# Patient Record
Sex: FEMALE | Race: BLACK OR AFRICAN AMERICAN | NOT HISPANIC OR LATINO | Employment: FULL TIME | URBAN - METROPOLITAN AREA
[De-identification: names, ages, dates, MRNs, and addresses within clinical notes are randomized per-mention and may not be internally consistent; named-entity substitution may affect disease eponyms.]

---

## 2021-03-20 PROCEDURE — 87086 URINE CULTURE/COLONY COUNT: CPT | Performed by: NURSE PRACTITIONER

## 2021-03-20 PROCEDURE — 87798 DETECT AGENT NOS DNA AMP: CPT | Performed by: NURSE PRACTITIONER

## 2021-03-20 PROCEDURE — 87801 DETECT AGNT MULT DNA AMPLI: CPT | Performed by: NURSE PRACTITIONER

## 2021-03-23 ENCOUNTER — TELEPHONE (OUTPATIENT)
Dept: URGENT CARE | Facility: CLINIC | Age: 26
End: 2021-03-23

## 2021-09-15 PROCEDURE — U0004 COV-19 TEST NON-CDC HGH THRU: HCPCS | Performed by: FAMILY MEDICINE

## 2021-09-17 ENCOUNTER — TELEPHONE (OUTPATIENT)
Dept: URGENT CARE | Facility: CLINIC | Age: 26
End: 2021-09-17

## 2021-09-27 ENCOUNTER — TRANSCRIBE ORDERS (OUTPATIENT)
Dept: ADMINISTRATIVE | Facility: HOSPITAL | Age: 26
End: 2021-09-27

## 2021-09-27 DIAGNOSIS — G44.52 HEADACHE, NEW DAILY PERSISTENT (NDPH): Primary | ICD-10-CM

## 2021-10-13 ENCOUNTER — HOSPITAL ENCOUNTER (OUTPATIENT)
Dept: CT IMAGING | Facility: HOSPITAL | Age: 26
Discharge: HOME | End: 2021-10-13
Admitting: NURSE PRACTITIONER

## 2021-10-13 DIAGNOSIS — G44.52 HEADACHE, NEW DAILY PERSISTENT (NDPH): ICD-10-CM

## 2021-10-13 PROCEDURE — 70450 CT HEAD/BRAIN W/O DYE: CPT

## 2024-05-09 ENCOUNTER — TRANSCRIBE ORDERS (OUTPATIENT)
Dept: NUTRITION | Facility: HOSPITAL | Age: 29
End: 2024-05-09
Payer: COMMERCIAL

## 2024-05-09 DIAGNOSIS — R73.03 PRE-DIABETES: Primary | ICD-10-CM

## 2024-06-28 ENCOUNTER — DOCUMENTATION (OUTPATIENT)
Dept: NUTRITION | Facility: HOSPITAL | Age: 29
End: 2024-06-28
Payer: COMMERCIAL

## 2024-06-28 ENCOUNTER — HOSPITAL ENCOUNTER (OUTPATIENT)
Dept: DIABETES SERVICES | Facility: HOSPITAL | Age: 29
Setting detail: RECURRING SERIES
Discharge: HOME | End: 2024-06-28

## 2024-07-01 NOTE — PROGRESS NOTES
Nutrition Services    Patient Name:  Deann Blackburn  YOB: 1995  MRN: [id number]  Admit Date:  (Not on file)    The patient attended 90 minute Diabetes Nutrition Appointment today for Prediabetes Education. Epic Users-see media tab for assessments and notes. Non-Epic users, assessments and notes will be sent per protocol.      Electronically signed by:  Sera Hutchinson RD  07/01/24 18:05 EDT

## 2025-02-12 ENCOUNTER — LAB (OUTPATIENT)
Facility: HOSPITAL | Age: 30
End: 2025-02-12
Payer: COMMERCIAL

## 2025-02-12 ENCOUNTER — TRANSCRIBE ORDERS (OUTPATIENT)
Facility: HOSPITAL | Age: 30
End: 2025-02-12
Payer: COMMERCIAL

## 2025-02-12 DIAGNOSIS — Z32.01 PREGNANCY EXAMINATION OR TEST, POSITIVE RESULT: ICD-10-CM

## 2025-02-12 DIAGNOSIS — Z32.01 PREGNANCY EXAMINATION OR TEST, POSITIVE RESULT: Primary | ICD-10-CM

## 2025-02-12 LAB
HCG INTACT+B SERPL-ACNC: 162 MIU/ML
PROGEST SERPL-MCNC: 6.08 NG/ML

## 2025-02-12 PROCEDURE — 84144 ASSAY OF PROGESTERONE: CPT

## 2025-02-12 PROCEDURE — 36415 COLL VENOUS BLD VENIPUNCTURE: CPT

## 2025-02-12 PROCEDURE — 84702 CHORIONIC GONADOTROPIN TEST: CPT

## 2025-02-14 ENCOUNTER — TRANSCRIBE ORDERS (OUTPATIENT)
Dept: LAB | Facility: HOSPITAL | Age: 30
End: 2025-02-14
Payer: COMMERCIAL

## 2025-02-14 ENCOUNTER — LAB (OUTPATIENT)
Dept: LAB | Facility: HOSPITAL | Age: 30
End: 2025-02-14
Payer: COMMERCIAL

## 2025-02-14 DIAGNOSIS — N92.6 IRREGULAR MENSTRUAL CYCLE: ICD-10-CM

## 2025-02-14 DIAGNOSIS — N92.6 IRREGULAR MENSTRUAL CYCLE: Primary | ICD-10-CM

## 2025-02-14 LAB — HCG INTACT+B SERPL-ACNC: 72.4 MIU/ML

## 2025-02-14 PROCEDURE — 84702 CHORIONIC GONADOTROPIN TEST: CPT

## 2025-02-14 PROCEDURE — 36415 COLL VENOUS BLD VENIPUNCTURE: CPT

## 2025-02-15 ENCOUNTER — APPOINTMENT (OUTPATIENT)
Facility: HOSPITAL | Age: 30
End: 2025-02-15
Payer: COMMERCIAL

## 2025-02-15 ENCOUNTER — HOSPITAL ENCOUNTER (EMERGENCY)
Facility: HOSPITAL | Age: 30
Discharge: HOME | End: 2025-02-15
Attending: FAMILY MEDICINE
Payer: COMMERCIAL

## 2025-02-15 VITALS
TEMPERATURE: 98.8 F | SYSTOLIC BLOOD PRESSURE: 155 MMHG | HEIGHT: 62 IN | RESPIRATION RATE: 16 BRPM | OXYGEN SATURATION: 98 % | WEIGHT: 181.4 LBS | HEART RATE: 89 BPM | DIASTOLIC BLOOD PRESSURE: 105 MMHG | BODY MASS INDEX: 33.38 KG/M2

## 2025-02-15 DIAGNOSIS — O20.9 VAGINAL BLEEDING IN PREGNANCY, FIRST TRIMESTER: Primary | ICD-10-CM

## 2025-02-15 LAB
ABO GROUP BLD: NORMAL
AMORPH URATE CRY URNS QL MICRO: ABNORMAL /HPF
BACTERIA UR QL AUTO: ABNORMAL /HPF
BASOPHILS # BLD AUTO: 0.02 10*3/MM3 (ref 0–0.2)
BASOPHILS NFR BLD AUTO: 0.4 % (ref 0–1.5)
BILIRUB UR QL STRIP: NEGATIVE
CLARITY UR: ABNORMAL
COLOR UR: YELLOW
DEPRECATED RDW RBC AUTO: 40.4 FL (ref 37–54)
EOSINOPHIL # BLD AUTO: 0.02 10*3/MM3 (ref 0–0.4)
EOSINOPHIL NFR BLD AUTO: 0.4 % (ref 0.3–6.2)
ERYTHROCYTE [DISTWIDTH] IN BLOOD BY AUTOMATED COUNT: 14.7 % (ref 12.3–15.4)
GLUCOSE UR STRIP-MCNC: NEGATIVE MG/DL
HCG INTACT+B SERPL-ACNC: 55.3 MIU/ML
HCT VFR BLD AUTO: 37.7 % (ref 34–46.6)
HGB BLD-MCNC: 11.5 G/DL (ref 12–15.9)
HGB UR QL STRIP.AUTO: ABNORMAL
HOLD SPECIMEN: NORMAL
HYALINE CASTS UR QL AUTO: ABNORMAL /LPF
IMM GRANULOCYTES # BLD AUTO: 0 10*3/MM3 (ref 0–0.05)
IMM GRANULOCYTES NFR BLD AUTO: 0 % (ref 0–0.5)
KETONES UR QL STRIP: ABNORMAL
LEUKOCYTE ESTERASE UR QL STRIP.AUTO: NEGATIVE
LYMPHOCYTES # BLD AUTO: 2.01 10*3/MM3 (ref 0.7–3.1)
LYMPHOCYTES NFR BLD AUTO: 40.9 % (ref 19.6–45.3)
MCH RBC QN AUTO: 22.8 PG (ref 26.6–33)
MCHC RBC AUTO-ENTMCNC: 30.5 G/DL (ref 31.5–35.7)
MCV RBC AUTO: 74.8 FL (ref 79–97)
MONOCYTES # BLD AUTO: 0.43 10*3/MM3 (ref 0.1–0.9)
MONOCYTES NFR BLD AUTO: 8.7 % (ref 5–12)
NEUTROPHILS NFR BLD AUTO: 2.44 10*3/MM3 (ref 1.7–7)
NEUTROPHILS NFR BLD AUTO: 49.6 % (ref 42.7–76)
NITRITE UR QL STRIP: NEGATIVE
NUMBER OF DOSES: NORMAL
PH UR STRIP.AUTO: 6 [PH] (ref 5–8)
PLATELET # BLD AUTO: 314 10*3/MM3 (ref 140–450)
PMV BLD AUTO: 10.6 FL (ref 6–12)
PROT UR QL STRIP: ABNORMAL
RBC # BLD AUTO: 5.04 10*6/MM3 (ref 3.77–5.28)
RBC # UR STRIP: ABNORMAL /HPF
REF LAB TEST METHOD: ABNORMAL
RH BLD: NEGATIVE
SP GR UR STRIP: >=1.03 (ref 1–1.03)
SQUAMOUS #/AREA URNS HPF: ABNORMAL /HPF
UROBILINOGEN UR QL STRIP: ABNORMAL
WBC # UR STRIP: ABNORMAL /HPF
WBC NRBC COR # BLD AUTO: 4.92 10*3/MM3 (ref 3.4–10.8)
WHOLE BLOOD HOLD COAG: NORMAL
WHOLE BLOOD HOLD SPECIMEN: NORMAL

## 2025-02-15 PROCEDURE — 76817 TRANSVAGINAL US OBSTETRIC: CPT

## 2025-02-15 PROCEDURE — 96372 THER/PROPH/DIAG INJ SC/IM: CPT

## 2025-02-15 PROCEDURE — 85025 COMPLETE CBC W/AUTO DIFF WBC: CPT | Performed by: FAMILY MEDICINE

## 2025-02-15 PROCEDURE — 36415 COLL VENOUS BLD VENIPUNCTURE: CPT

## 2025-02-15 PROCEDURE — 86900 BLOOD TYPING SEROLOGIC ABO: CPT | Performed by: FAMILY MEDICINE

## 2025-02-15 PROCEDURE — 25010000002 RHO D IMMUNE GLOBULIN 1500 UNIT/2ML SOLUTION PREFILLED SYRINGE: Performed by: PHYSICIAN ASSISTANT

## 2025-02-15 PROCEDURE — 99284 EMERGENCY DEPT VISIT MOD MDM: CPT

## 2025-02-15 PROCEDURE — 84702 CHORIONIC GONADOTROPIN TEST: CPT | Performed by: FAMILY MEDICINE

## 2025-02-15 PROCEDURE — 86901 BLOOD TYPING SEROLOGIC RH(D): CPT | Performed by: FAMILY MEDICINE

## 2025-02-15 PROCEDURE — 81001 URINALYSIS AUTO W/SCOPE: CPT | Performed by: PHYSICIAN ASSISTANT

## 2025-02-15 PROCEDURE — 87086 URINE CULTURE/COLONY COUNT: CPT | Performed by: PHYSICIAN ASSISTANT

## 2025-02-15 RX ORDER — SODIUM CHLORIDE 0.9 % (FLUSH) 0.9 %
10 SYRINGE (ML) INJECTION AS NEEDED
Status: DISCONTINUED | OUTPATIENT
Start: 2025-02-15 | End: 2025-02-15 | Stop reason: HOSPADM

## 2025-02-15 RX ADMIN — HUMAN RHO(D) IMMUNE GLOBULIN 1500 UNITS: 1500 SOLUTION INTRAMUSCULAR; INTRAVENOUS at 10:40

## 2025-02-15 NOTE — FSED PROVIDER NOTE
"Subjective  History of Present Illness:    29-year-old  female presents to ED for evaluation of vaginal bleeding during pregnancy.  Patient reports first day of last menstrual cycle as .  She took a positive pregnancy test last week.  On  she began having some light pink vaginal spotting.  Patient follows with [facility].  She was evaluated and had lab work done relative to the bleeding on  and again on .  She has not received the results.  This morning she began having some mildly increased bleeding although notes that the bleeding is only when she wipes after using the restroom, light pink to red.  No tissue or clots noted.  Denies any fevers, chills, abdominal pain, nausea, emesis, dysuria, hematuria.  Unsure of blood type.      Nurses Notes reviewed and agree, including vitals, allergies, social history and prior medical history.     REVIEW OF SYSTEMS: All systems reviewed and not pertinent unless noted.  Review of Systems   Genitourinary:  Positive for vaginal bleeding.   All other systems reviewed and are negative.      Past Medical History:   Diagnosis Date    Hypertension     IBS (irritable bowel syndrome)     Seasonal allergies        Allergies:    Iodine      History reviewed. No pertinent surgical history.      Social History     Socioeconomic History    Marital status: Single   Tobacco Use    Smoking status: Never     Passive exposure: Never    Smokeless tobacco: Never   Vaping Use    Vaping status: Never Used   Substance and Sexual Activity    Alcohol use: Yes     Comment: sometimes     Drug use: Never    Sexual activity: Defer         Family History   Family history unknown: Yes       Objective  Physical Exam:  BP (!) 155/105 (BP Location: Right arm, Patient Position: Sitting)   Pulse 89   Temp 98.8 °F (37.1 °C) (Oral)   Resp 16   Ht 157.5 cm (62\")   Wt 82.3 kg (181 lb 6.4 oz)   LMP 2025 (Exact Date)   SpO2 98%   BMI 33.18 kg/m² "      Physical Exam  Vitals and nursing note reviewed.   Constitutional:       General: She is not in acute distress.  HENT:      Head: Normocephalic and atraumatic.   Cardiovascular:      Rate and Rhythm: Normal rate and regular rhythm.   Pulmonary:      Effort: Pulmonary effort is normal. No respiratory distress.      Breath sounds: Normal breath sounds.   Abdominal:      Palpations: Abdomen is soft.      Tenderness: There is no abdominal tenderness. There is no guarding or rebound.   Skin:     General: Skin is warm and dry.   Neurological:      Mental Status: She is alert.      Comments: Awake and alert   Psychiatric:         Mood and Affect: Mood normal.         Behavior: Behavior normal.         Procedures    ED Course:         Lab Results (last 24 hours)       Procedure Component Value Units Date/Time    CBC & Differential [[id number]]  (Abnormal) Collected: 02/15/25 0923    Specimen: Blood Updated: 02/15/25 0937    Narrative:      The following orders were created for panel order CBC & Differential.  Procedure                               Abnormality         Status                     ---------                               -----------         ------                     CBC Auto Differential[[id number]]        Abnormal            Final result                 Please view results for these tests on the individual orders.    hCG, Quantitative, Pregnancy [[id number]] Collected: 02/15/25 0923    Specimen: Blood Updated: 02/15/25 1000     HCG Quantitative 55.30 mIU/mL     Narrative:      HCG Ranges by Gestational Age    Females - non-pregnant premenopausal   </= 1mIU/mL HCG  Females - postmenopausal               </= 7mIU/mL HCG    3 Weeks         5.8 -    71.2 mIU/mL  4 Weeks         9.5 -     750 mIU/mL  5 Weeks         217 -   7,138 mIU/mL  6 Weeks         158 -  31,795 mIU/mL  7 Weeks       3,697 - 163,563 mIU/mL  8 Weeks      32,065 - 149,571 mIU/mL  9 Weeks      63,803 - 151,410 mIU/mL  10 Weeks     46,509 -  186,977 mIU/mL  12 Weeks     27,832 - 210,612 mIU/mL  14 Weeks     13,950 -  62,530 mIU/mL  15 Weeks     12,039 -  70,971 mIU/mL  16 Weeks      9,040 -  56,451 mIU/mL  17 Weeks      8,175 -  55,868 mIU/mL  18 Weeks      8,099 -  58,176 mIU/mL    CBC Auto Differential [[id number]]  (Abnormal) Collected: 02/15/25 0923    Specimen: Blood Updated: 02/15/25 0937     WBC 4.92 10*3/mm3      RBC 5.04 10*6/mm3      Hemoglobin 11.5 g/dL      Hematocrit 37.7 %      MCV 74.8 fL      MCH 22.8 pg      MCHC 30.5 g/dL      RDW 14.7 %      RDW-SD 40.4 fl      MPV 10.6 fL      Platelets 314 10*3/mm3      Neutrophil % 49.6 %      Lymphocyte % 40.9 %      Monocyte % 8.7 %      Eosinophil % 0.4 %      Basophil % 0.4 %      Immature Grans % 0.0 %      Neutrophils, Absolute 2.44 10*3/mm3      Lymphocytes, Absolute 2.01 10*3/mm3      Monocytes, Absolute 0.43 10*3/mm3      Eosinophils, Absolute 0.02 10*3/mm3      Basophils, Absolute 0.02 10*3/mm3      Immature Grans, Absolute 0.00 10*3/mm3     Urinalysis With Microscopic If Indicated (No Culture) - Urine, Clean Catch [[id number]]  (Abnormal) Collected: 02/15/25 1036    Specimen: Urine, Clean Catch Updated: 02/15/25 1135     Color, UA Yellow     Appearance, UA Cloudy     pH, UA 6.0     Specific Gravity, UA >=1.030     Glucose, UA Negative     Ketones, UA 15 mg/dL (1+)     Bilirubin, UA Negative     Blood, UA Large (3+)     Protein, UA Trace     Leuk Esterase, UA Negative     Nitrite, UA Negative     Urobilinogen, UA 0.2 E.U./dL    Urinalysis, Microscopic Only - Urine, Clean Catch [[id number]]  (Abnormal) Collected: 02/15/25 1036    Specimen: Urine, Clean Catch Updated: 02/15/25 1136     RBC, UA 6-10 /HPF      WBC, UA 0-2 /HPF      Bacteria, UA 2+ /HPF      Squamous Epithelial Cells, UA 21-30 /HPF      Hyaline Casts, UA None Seen /LPF      Amorphous Crystals, UA Large/3+ /HPF      Methodology Manual Light Microscopy    Urine Culture - Urine, Urine, Clean Catch [[id number]] Collected: 02/15/25  1036    Specimen: Urine, Clean Catch Updated: 02/15/25 1158             US Ob Transvaginal    Result Date: 2/15/2025  US OB TRANSVAGINAL Date of Exam: 2/15/2025 10:14 AM EST Indication: vaginal bleeding. Comparison: No comparisons available. Technique: Transvaginal ultrasound was performed to evaluate pregnancy.  Real time scanning was performed with multiple image documentation per protocol.  Findings: Uterus: Measures 7 x 4.1 x 3.2 cm. Endometrial thickness 8 mm. No intrauterine gestational sac or fetal pole seen. Right ovary: Size: Measures 3.3 x 2 x 1.9 cm. Appearance: Normal morphology. No masses. Simple appearing cystic lesions, likely physiologic follicles. Intact appearing vascularity. Left ovary: Size: Measures 3.5 x 2.3 x 1.8 cm. Appearance: Normal morphology. No masses. Simple appearing cystic lesions, likely physiologic follicles. Intact appearing vascularity. Other: No free fluid.     Impression: Impression: Findings compatible with pregnancy of unknown location. Differential includes normal early intrauterine pregnancy, occult ectopic pregnancy, or miscarriage. Recommend serial quantitative beta-hCG measurements and short interval follow-up ultrasound (i.e.11-14 days). Electronically Signed: Carlos Frost  2/15/2025 10:47 AM EST  Workstation ID: [id number]        MDM      Initial impression of presenting illness: 29-year-old female presents to ED for evaluation of vaginal bleeding during pregnancy.  LMP January 7.  Bleeding began February 11.  Please refer to HPI for further details.  Did review labs from earlier this week.  hCG quant 162 on 2/12 trending down to 72 on 2/14.    DDX: includes but is not limited to: Miscarriage, subchorionic hematoma, nonspecific vaginal bleeding during first trimester pregnancy, UTI    Patient arrives afebrile with stable vitals interpreted by myself.     Pertinent features from physical exam: Abdomen soft nontender without any rebound or guarding.  Patient  hemodynamically stable.     Initial diagnostic plan: CBC, hCG quant, Rh Ig, UA    Results from initial plan were reviewed and concerning for threatened miscarriage.  Quant trending down to 55.  Patient is O-, RhoGAM administered during visit.  Urine sample is contaminated, does have some bacteriuria.  Will culture and defer antibiotics at this time given that patient is asymptomatic with urine.  I have asked her to follow-up closely with her OB/GYN on Monday for reevaluation and repeat quant hCG.  She has been given strict return precautions for any increased bleeding, uncontrolled pain, any other worsening symptoms or concerns.    Interventions: Medications administered as below    Medications   sodium chloride 0.9 % flush 10 mL (has no administration in time range)   Rho D Immune Globulin (RHOPHYLAC) injection 1,500 Units (1,500 Units Intramuscular Given 2/15/25 1040)       -----  ED Disposition       ED Disposition   Discharge    Condition   Stable    Comment   --             Final diagnoses:   Vaginal bleeding in pregnancy, first trimester      Your Follow-Up Providers       Go to  [facility] EMERGENCY DEPARTMENT [place].    Specialty: Emergency Medicine  Follow up details: If symptoms worsen  [address]  [facility] [address] [phone]             your OBGYN.                       Contact information for after-discharge care    Follow-up information has not been specified.                    Your medication list        CONTINUE taking these medications        Instructions Last Dose Given Next Dose Due   Auvi-Q 0.3 MG/0.3ML solution auto-injector injection  Generic drug: EPINEPHrine      INJECT AS NEEDED FOR SEVERE ALLERGIC REACTION INCLUDING ANAPHYLAXIS AS DIRECTED Injection for 14 Days       dicyclomine 20 MG tablet  Commonly known as: BENTYL      Take 1 tablet by mouth Every 6 (Six) Hours.       metoprolol succinate XL 25 MG 24 hr tablet  Commonly known as:  TOPROL-XL      Take 1 tablet by mouth Daily.       montelukast 10 MG tablet  Commonly known as: SINGULAIR      TAKE 1 TABLET BY MOUTH EVERY DAY Oral for 30 Days       NuvaRing 0.12-0.015 MG/24HR vaginal ring  Generic drug: etonogestrel-ethinyl estradiol      1 each.       oseltamivir 75 MG capsule  Commonly known as: TAMIFLU      Take 1 capsule by mouth 2 (Two) Times a Day.

## 2025-02-15 NOTE — DISCHARGE INSTRUCTIONS
Your hCG levels are trending down, this would make us concerned for possible miscarriage.  You should follow-up closely with your OB/GYN for repeat evaluation and lab work early this week.  Please call the office on Monday when they open.  Return to ED immediately for any increased bleeding, pain, any other worsening symptoms or concerns.

## 2025-02-17 LAB — BACTERIA SPEC AEROBE CULT: NORMAL

## 2025-05-19 ENCOUNTER — HOSPITAL ENCOUNTER (EMERGENCY)
Facility: HOSPITAL | Age: 30
Discharge: HOME | End: 2025-05-19
Attending: STUDENT IN AN ORGANIZED HEALTH CARE EDUCATION/TRAINING PROGRAM | Admitting: STUDENT IN AN ORGANIZED HEALTH CARE EDUCATION/TRAINING PROGRAM
Payer: COMMERCIAL

## 2025-05-19 ENCOUNTER — APPOINTMENT (OUTPATIENT)
Facility: HOSPITAL | Age: 30
End: 2025-05-19
Payer: COMMERCIAL

## 2025-05-19 VITALS
HEART RATE: 81 BPM | DIASTOLIC BLOOD PRESSURE: 105 MMHG | OXYGEN SATURATION: 100 % | HEIGHT: 62 IN | WEIGHT: 171.96 LBS | TEMPERATURE: 98.4 F | RESPIRATION RATE: 16 BRPM | BODY MASS INDEX: 31.64 KG/M2 | SYSTOLIC BLOOD PRESSURE: 140 MMHG

## 2025-05-19 DIAGNOSIS — M79.602 PAIN OF LEFT UPPER EXTREMITY: Primary | ICD-10-CM

## 2025-05-19 PROCEDURE — 73080 X-RAY EXAM OF ELBOW: CPT

## 2025-05-19 PROCEDURE — 99283 EMERGENCY DEPT VISIT LOW MDM: CPT | Performed by: STUDENT IN AN ORGANIZED HEALTH CARE EDUCATION/TRAINING PROGRAM

## 2025-05-19 RX ORDER — ACETAMINOPHEN 500 MG
1000 TABLET ORAL ONCE
Status: COMPLETED | OUTPATIENT
Start: 2025-05-19 | End: 2025-05-19

## 2025-05-19 RX ADMIN — ACETAMINOPHEN 1000 MG: 500 TABLET, FILM COATED ORAL at 19:14

## 2025-05-19 NOTE — DISCHARGE INSTRUCTIONS
Use the Ace wrap to help with comfort and support.  You may take ibuprofen or Tylenol for pain management.  Your x-ray today did not show any acute fracture however return to the ER if pain worsens or if you feel you need further care and evaluation by ER provider.

## 2025-05-19 NOTE — FSED PROVIDER NOTE
"Subjective  History of Present Illness:    Patient is a 29-year-old female presents to the ER complaining of left arm pain ongoing for 3 days.  She states he was trying to slide off of a boat when she hit her arm on a metal pole.  She reports increased pain with range of motion.  However denies any numbness or tingling in the arm.  Denies other injuries or complaints.    Nurses Notes reviewed and agree, including vitals, allergies, social history and prior medical history.     REVIEW OF SYSTEMS: All systems reviewed and not pertinent unless noted.  Review of Systems   Musculoskeletal:  Positive for arthralgias and myalgias.   All other systems reviewed and are negative.      Past Medical History:   Diagnosis Date    Hypertension     IBS (irritable bowel syndrome)     Seasonal allergies        Allergies:    Iodine      History reviewed. No pertinent surgical history.      Social History     Socioeconomic History    Marital status: Single   Tobacco Use    Smoking status: Never     Passive exposure: Never    Smokeless tobacco: Never   Vaping Use    Vaping status: Never Used   Substance and Sexual Activity    Alcohol use: Yes     Comment: sometimes     Drug use: Never    Sexual activity: Yes         Family History   Family history unknown: Yes       Objective  Physical Exam:  BP (!) 153/110   Pulse 67   Temp 98.4 °F (36.9 °C) (Oral)   Resp 16   Ht 158 cm (62.21\")   Wt 78 kg (171 lb 15.3 oz)   LMP 05/15/2025 (Exact Date)   SpO2 100%   Breastfeeding Unknown   BMI 31.24 kg/m²      Physical Exam  Vitals reviewed.   Constitutional:       Appearance: Normal appearance.   HENT:      Head: Normocephalic and atraumatic.   Eyes:      Extraocular Movements: Extraocular movements intact.      Pupils: Pupils are equal, round, and reactive to light.   Cardiovascular:      Rate and Rhythm: Normal rate and regular rhythm.   Pulmonary:      Effort: Pulmonary effort is normal.      Breath sounds: Normal breath sounds. "   Musculoskeletal:         General: Tenderness and signs of injury present. Normal range of motion.   Skin:     General: Skin is warm and dry.      Capillary Refill: Capillary refill takes less than 2 seconds.      Findings: Bruising present.   Neurological:      General: No focal deficit present.      Mental Status: She is alert and oriented to person, place, and time.   Psychiatric:         Mood and Affect: Mood normal.         Behavior: Behavior normal.         Procedures    ED Course:         Lab Results (last 24 hours)       ** No results found for the last 24 hours. **             XR Elbow 3+ View Left  Result Date: 5/19/2025  XR ELBOW 3+ VW LEFT Date of Exam: 5/19/2025 6:22 PM EDT Indication: fall with significant bruising Comparison: None available. Findings: No acute fracture or malalignment. No significant joint effusion. No focal soft tissue abnormalities appreciated. Joint spaces appear grossly preserved without significant degenerative change.     Impression: Impression: No acute fracture or malalignment. Electronically Signed: Carlos Frost MD  5/19/2025 7:09 PM EDT  Workstation ID: [id number]         [facility]      Initial impression of presenting illness: Left arm pain with ecchymosis noted to the anterior aspect the left forearm.    DDX: includes but is not limited to: Fracture, soft tissue injury, strain, sprain    Patient arrives stable condition no acute distress complaint of pain in the right forearm with vitals interpreted by myself.     Pertinent features from physical exam: Ecchymosis to left forearm.    Initial diagnostic plan: Plain from x-ray    Results from initial plan were reviewed and interpreted by me revealing no acute fracture    Diagnostic information from other sources: N/A    Interventions / Re-evaluation: Patient given Tylenol which has improved the pain.  Patient advised continue to ice the area and wear the Ace wrap for comfort support.  Patient advised take ibuprofen or Tylenol  for pain management.    Medications   acetaminophen (TYLENOL) tablet 1,000 mg (1,000 mg Oral Given 5/19/25 1914)       Results/clinical rationale were discussed with patient    Consultations/Discussion of results with other physicians: N/A    Data interpreted: Nursing notes reviewed, vital signs reviewed.  Labs independently interpreted by me (CBC, CMP, lipase, UA, troponin, ABG, lactic acid, procalcitonin).  Imaging independently interpreted by me (x-ray, CT scan).  EKG independently interpreted by me.  O2 saturation:    Counseling: Discussed the results above with the patient regarding need for admission or discharge.  Patient understands and agrees plan of care.      -----  ED Disposition       ED Disposition   Discharge    Condition   Stable    Comment   --             Final diagnoses:   Pain of left upper extremity      Your Follow-Up Providers       Schedule an appointment as soon as possible for a visit  with Merlyn Kuhn APRN.    Specialty: Nurse Practitioner  Follow up details: To follow-up with today's ER visit  [address] [place], [place] [id number] [phone]                       Contact information for after-discharge care    Follow-up information has not been specified.                    Your medication list        CONTINUE taking these medications        Instructions Last Dose Given Next Dose Due   Auvi-Q 0.3 MG/0.3ML solution auto-injector injection  Generic drug: EPINEPHrine      INJECT AS NEEDED FOR SEVERE ALLERGIC REACTION INCLUDING ANAPHYLAXIS AS DIRECTED Injection for 14 Days       dicyclomine 20 MG tablet  Commonly known as: BENTYL      Take 1 tablet by mouth Every 6 (Six) Hours.       metoprolol succinate XL 25 MG 24 hr tablet  Commonly known as: TOPROL-XL      Take 1 tablet by mouth Daily.       montelukast 10 MG tablet  Commonly known as: SINGULAIR      TAKE 1 TABLET BY MOUTH EVERY DAY Oral for 30 Days       NuvaRing 0.12-0.015 MG/24HR vaginal ring  Generic drug:  etonogestrel-ethinyl estradiol      1 each.       oseltamivir 75 MG capsule  Commonly known as: TAMIFLU      Take 1 capsule by mouth 2 (Two) Times a Day.                 Family history of sickle cell trait in father     Family history of sickle cell trait in mother     Sibling  Still living? Yes, Estimated age: Age Unknown  Family history of sickle cell disease, Age at diagnosis: Age Unknown  Family history of sarcoidosis, Age at diagnosis: Age Unknown

## 2025-07-21 ENCOUNTER — LAB (OUTPATIENT)
Age: 30
End: 2025-07-21
Payer: COMMERCIAL

## 2025-07-21 ENCOUNTER — OFFICE VISIT (OUTPATIENT)
Age: 30
End: 2025-07-21
Payer: COMMERCIAL

## 2025-07-21 VITALS
DIASTOLIC BLOOD PRESSURE: 74 MMHG | HEART RATE: 84 BPM | TEMPERATURE: 98.4 F | RESPIRATION RATE: 18 BRPM | BODY MASS INDEX: 32.39 KG/M2 | HEIGHT: 62 IN | SYSTOLIC BLOOD PRESSURE: 112 MMHG | WEIGHT: 176 LBS | OXYGEN SATURATION: 100 %

## 2025-07-21 DIAGNOSIS — E66.811 CLASS 1 OBESITY DUE TO EXCESS CALORIES WITH SERIOUS COMORBIDITY AND BODY MASS INDEX (BMI) OF 32.0 TO 32.9 IN ADULT: ICD-10-CM

## 2025-07-21 DIAGNOSIS — R73.03 PREDIABETES: Primary | ICD-10-CM

## 2025-07-21 DIAGNOSIS — Z23 ENCOUNTER FOR IMMUNIZATION: ICD-10-CM

## 2025-07-21 DIAGNOSIS — R73.03 PREDIABETES: ICD-10-CM

## 2025-07-21 DIAGNOSIS — E66.09 CLASS 1 OBESITY DUE TO EXCESS CALORIES WITH SERIOUS COMORBIDITY AND BODY MASS INDEX (BMI) OF 32.0 TO 32.9 IN ADULT: ICD-10-CM

## 2025-07-21 DIAGNOSIS — Z91.013 SHELLFISH ALLERGY: ICD-10-CM

## 2025-07-21 DIAGNOSIS — D50.9 MICROCYTIC ANEMIA: ICD-10-CM

## 2025-07-21 DIAGNOSIS — K58.0 IRRITABLE BOWEL SYNDROME WITH DIARRHEA: ICD-10-CM

## 2025-07-21 LAB
DEPRECATED RDW RBC AUTO: 36.2 FL (ref 37–54)
ERYTHROCYTE [DISTWIDTH] IN BLOOD BY AUTOMATED COUNT: 13.9 % (ref 12.3–15.4)
HBA1C MFR BLD: 5.3 % (ref 4.8–5.6)
HCT VFR BLD AUTO: 36.9 % (ref 34–46.6)
HGB BLD-MCNC: 11.3 G/DL (ref 12–15.9)
MCH RBC QN AUTO: 22.8 PG (ref 26.6–33)
MCHC RBC AUTO-ENTMCNC: 30.6 G/DL (ref 31.5–35.7)
MCV RBC AUTO: 74.5 FL (ref 79–97)
PLATELET # BLD AUTO: 332 10*3/MM3 (ref 140–450)
PMV BLD AUTO: 10.9 FL (ref 6–12)
RBC # BLD AUTO: 4.95 10*6/MM3 (ref 3.77–5.28)
WBC NRBC COR # BLD AUTO: 6.21 10*3/MM3 (ref 3.4–10.8)

## 2025-07-21 PROCEDURE — 90471 IMMUNIZATION ADMIN: CPT | Performed by: STUDENT IN AN ORGANIZED HEALTH CARE EDUCATION/TRAINING PROGRAM

## 2025-07-21 PROCEDURE — 84466 ASSAY OF TRANSFERRIN: CPT | Performed by: STUDENT IN AN ORGANIZED HEALTH CARE EDUCATION/TRAINING PROGRAM

## 2025-07-21 PROCEDURE — 85027 COMPLETE CBC AUTOMATED: CPT | Performed by: STUDENT IN AN ORGANIZED HEALTH CARE EDUCATION/TRAINING PROGRAM

## 2025-07-21 PROCEDURE — 90715 TDAP VACCINE 7 YRS/> IM: CPT | Performed by: STUDENT IN AN ORGANIZED HEALTH CARE EDUCATION/TRAINING PROGRAM

## 2025-07-21 PROCEDURE — 36415 COLL VENOUS BLD VENIPUNCTURE: CPT | Performed by: STUDENT IN AN ORGANIZED HEALTH CARE EDUCATION/TRAINING PROGRAM

## 2025-07-21 PROCEDURE — 80053 COMPREHEN METABOLIC PANEL: CPT | Performed by: STUDENT IN AN ORGANIZED HEALTH CARE EDUCATION/TRAINING PROGRAM

## 2025-07-21 PROCEDURE — 99214 OFFICE O/P EST MOD 30 MIN: CPT | Performed by: STUDENT IN AN ORGANIZED HEALTH CARE EDUCATION/TRAINING PROGRAM

## 2025-07-21 PROCEDURE — 83036 HEMOGLOBIN GLYCOSYLATED A1C: CPT | Performed by: STUDENT IN AN ORGANIZED HEALTH CARE EDUCATION/TRAINING PROGRAM

## 2025-07-21 PROCEDURE — 83540 ASSAY OF IRON: CPT | Performed by: STUDENT IN AN ORGANIZED HEALTH CARE EDUCATION/TRAINING PROGRAM

## 2025-07-21 PROCEDURE — 80061 LIPID PANEL: CPT | Performed by: STUDENT IN AN ORGANIZED HEALTH CARE EDUCATION/TRAINING PROGRAM

## 2025-07-21 RX ORDER — NORTRIPTYLINE HYDROCHLORIDE 10 MG/1
CAPSULE ORAL
COMMUNITY
End: 2025-07-21

## 2025-07-21 RX ORDER — CITALOPRAM HYDROBROMIDE 10 MG/1
10 TABLET ORAL DAILY
COMMUNITY
End: 2025-07-21 | Stop reason: SDUPTHER

## 2025-07-21 RX ORDER — SEMAGLUTIDE 0.68 MG/ML
0.5 INJECTION, SOLUTION SUBCUTANEOUS WEEKLY
COMMUNITY
Start: 2025-05-22 | End: 2025-07-21

## 2025-07-21 RX ORDER — CITALOPRAM HYDROBROMIDE 10 MG/1
10 TABLET ORAL DAILY
Qty: 90 TABLET | Refills: 3 | Status: SHIPPED | OUTPATIENT
Start: 2025-07-21

## 2025-07-21 RX ORDER — DICYCLOMINE HCL 20 MG
20 TABLET ORAL EVERY 6 HOURS PRN
Qty: 60 TABLET | Refills: 11 | Status: SHIPPED | OUTPATIENT
Start: 2025-07-21

## 2025-07-21 RX ORDER — SEMAGLUTIDE 1 MG/.5ML
1 INJECTION, SOLUTION SUBCUTANEOUS WEEKLY
Qty: 2 ML | Refills: 0 | Status: SHIPPED | OUTPATIENT
Start: 2025-07-21

## 2025-07-21 RX ORDER — EPINEPHRINE 0.3 MG/.3ML
0.3 INJECTION SUBCUTANEOUS ONCE
Qty: 2 EACH | Refills: 0 | Status: SHIPPED | OUTPATIENT
Start: 2025-07-21 | End: 2025-07-21

## 2025-07-21 RX ORDER — METFORMIN HYDROCHLORIDE 500 MG/1
TABLET, EXTENDED RELEASE ORAL
COMMUNITY
End: 2025-07-21

## 2025-07-21 NOTE — PROGRESS NOTES
Office Note     Name: Deann Blackburn    : 1995     MRN: [id number]     Chief Complaint  Establish Care (Just moved to this side of [facility])    Subjective     History of Present Illness:    History of Present Illness  The patient is a 29-year-old female here to establish care.    Previously diagnosed with prediabetes and obesity, weighing nearly 200 pounds at her heaviest. Last A1c test was 7-8 months ago. On Ozempic 0.5 mg, which helped her lose weight to 175 pounds. Seeking refill and open to trying Wegovy.    Diagnosed with IBS, experiencing constipation and diarrhea. Despite dietary changes, continues to have severe stomach cramps and diarrhea after certain foods. Colonoscopy and blood tests, including gluten test, were normal. Tried FODMAP diet for a few months. Working with an online nutritionist for stress, anxiety, and eating habits. Takes dicyclomine for stomach spasms, providing relief. Also takes fiber and MiraLAX as needed. Discontinued nortriptyline due to ineffectiveness.    Severe allergies, takes Singulair as needed, and uses OTC Zyrtec or Claritin. Shellfish allergy, carries Auvi-Q. Had severe allergic reaction to crab eggs three years ago, requiring hospitalization. Minor reactions managed with Benadryl.    On metoprolol for hypertension, diagnosed at 196 pounds. Recent BP readings normal, even without metoprolol today. Lost 30 pounds since diagnosis. Monitors BP at home with automatic arm cuff.    Experiences anxiety, on low dose of Celexa, finds it helpful. Reports situational anxiety causing hand tremors. Satisfied with current dose, does not wish to increase.    Currently menstruating, not on birth control. Periods regular, monthly, lasting four days, sometimes heavy. Miscarriage in 2025, resulting in heavy periods for several months. Follows up with OB-GYN at [facility], annual Pap smears, last in 2024. Had abnormal Pap smears in the past, subsequent follow-ups  normal.    Social History:  Occupations:     GYNECOLOGICAL HISTORY:  Duration: 4 days  Frequency and Flow: Monthly, heavy at times    PAST SURGICAL HISTORY:  Colonoscopy    FAMILY HISTORY  Mother passed away from diabetes. Family history of hypertension, hypercholesterolemia, and diabetes.        Past Medical History:   Past Medical History:   Diagnosis Date    Hypertension     IBS (irritable bowel syndrome)     Seasonal allergies        Past Surgical History: History reviewed. No pertinent surgical history.    Immunizations:   Immunization History   Administered Date(s) Administered    COVID-19 (PFIZER) Purple Cap Monovalent 03/02/2021, 04/06/2021, 02/07/2022    DTaP / Hep B / IPV 05/20/2009    Fluzone (or Fluarix & Flulaval for VFC) >6mos 10/18/2023    HPV Quadrivalent 05/22/2009, 08/26/2010, 05/24/2011    Hep A, 2 Dose 08/26/2010, 05/24/2011    Hib (PRP-T) 05/20/2009    Influenza Seasonal Injectable 01/20/2014    Meningococcal Conjugate 05/22/2009    Meningococcal MCV4P (Menactra) 07/18/2014    PEDS-Pneumococcal Conjugate (PCV7) 05/20/2009    PPD Test 10/09/2017    Rho (D) Immune Globulin 02/15/2025    Rotavirus Pentavalent 05/20/2009    Tdap 07/21/2025    Varicella 08/26/2010        Medications:     Current Outpatient Medications:     citalopram (CeleXA) 10 MG tablet, Take 1 tablet by mouth Daily., Disp: 90 tablet, Rfl: 3    dicyclomine (BENTYL) 20 MG tablet, Take 1 tablet by mouth Every 6 (Six) Hours As Needed for Abdominal Cramping., Disp: 60 tablet, Rfl: 11    EPINEPHrine (Auvi-Q) 0.3 MG/0.3ML solution auto-injector injection, Inject 0.3 mL into the appropriate muscle as directed by prescriber 1 (One) Time for 1 dose., Disp: 2 each, Rfl: 0    montelukast (SINGULAIR) 10 MG tablet, TAKE 1 TABLET BY MOUTH EVERY DAY Oral for 30 Days, Disp: , Rfl:     Semaglutide-Weight Management (Wegovy) 1 MG/0.5ML solution auto-injector, Inject 0.5 mL under the skin into the appropriate area as directed 1 (One)  "Time Per Week., Disp: 2 mL, Rfl: 0    Allergies:   Allergies   Allergen Reactions    Shellfish-Derived Products Hives    Iodine Other (See Comments) and Rash       Family History:   Family History   Family history unknown: Yes       Social History:   Social History     Socioeconomic History    Marital status: Single   Tobacco Use    Smoking status: Never     Passive exposure: Never    Smokeless tobacco: Never   Vaping Use    Vaping status: Never Used   Substance and Sexual Activity    Alcohol use: Yes     Comment: sometimes     Drug use: Never    Sexual activity: Yes         Objective     Vital Signs  /74 (BP Location: Left arm, Patient Position: Sitting, Cuff Size: Adult)   Pulse 84   Temp 98.4 °F (36.9 °C) (Oral)   Resp 18   Ht 157.5 cm (62\")   Wt 79.8 kg (176 lb)   SpO2 100%   BMI 32.19 kg/m²   Estimated body mass index is 32.19 kg/m² as calculated from the following:    Height as of this encounter: 157.5 cm (62\").    Weight as of this encounter: 79.8 kg (176 lb).            Physical Exam  Vitals reviewed.   Constitutional:       Appearance: Normal appearance.   HENT:      Head: Normocephalic and atraumatic.   Cardiovascular:      Rate and Rhythm: Normal rate.   Pulmonary:      Effort: Pulmonary effort is normal. No respiratory distress.   Neurological:      General: No focal deficit present.      Mental Status: She is alert and oriented to person, place, and time.   Psychiatric:         Mood and Affect: Mood normal.         Behavior: Behavior normal.          Assessment and Plan     Diagnoses and all orders for this visit:    1. Prediabetes (Primary)  -     Hemoglobin A1c; Future  -     Comprehensive Metabolic Panel; Future  -     Lipid panel; Future    2. Class 1 obesity due to excess calories with serious comorbidity and body mass index (BMI) of 32.0 to 32.9 in adult  -     Semaglutide-Weight Management (Wegovy) 1 MG/0.5ML solution auto-injector; Inject 0.5 mL under the skin into the appropriate " area as directed 1 (One) Time Per Week.  Dispense: 2 mL; Refill: 0  -     Comprehensive Metabolic Panel; Future  -     Lipid panel; Future    3. Irritable bowel syndrome with diarrhea  -     dicyclomine (BENTYL) 20 MG tablet; Take 1 tablet by mouth Every 6 (Six) Hours As Needed for Abdominal Cramping.  Dispense: 60 tablet; Refill: 11    4. Shellfish allergy  -     EPINEPHrine (Auvi-Q) 0.3 MG/0.3ML solution auto-injector injection; Inject 0.3 mL into the appropriate muscle as directed by prescriber 1 (One) Time for 1 dose.  Dispense: 2 each; Refill: 0    5. Microcytic anemia  -     CBC No Differential; Future  -     Iron Profile w/o Ferritin; Future    6. Encounter for immunization  -     Tdap Vaccine => 6yo IM (BOOSTRIX/ADACEL)    Other orders  -     citalopram (CeleXA) 10 MG tablet; Take 1 tablet by mouth Daily.  Dispense: 90 tablet; Refill: 3         Assessment & Plan  Prediabetes  Obesity, Body mass index is 32.19 kg/m².  - On Ozempic 0.5 mg, weight reduced to 175 pounds from 200 lbs previously   Plan:   - Prescription for Wegovy 1 mg under diagnosis of prediabetes and obesity  - If Wegovy not covered, revert to Ozempic  - A1c today       IBS  - Experiencing constipation and diarrhea  - Reintroduce Metamucil to regulate bowel movements  - Refill for Bentyl (dicyclomine) as needed for stomach spasms  - Continues working with dietitian/nutritionist    Shellfish Allergy  - Severe shellfish allergy, previously experienced anaphylactic reaction  - Uses Auvi-Q as needed  - Refill for Auvi-Q    Hypertension, resolved   - On metoprolol but reports she did not take it today and BP normal. Suspect with weight loss that her HTN has resolved   Plan:   - Discontinue metoprolol   - Monitor BP twice weekly, aim for systolic <130  - If BP increases after discontinuing metoprolol, resume medication and request refill    Anxiety  - On low dose of Celexa, finds it helpful  - Refill for Celexa    Microcytic anemia  - Mild anemia and  microcytosis  - Repeat blood counts and iron panel today  - If iron deficiency confirmed, start iron supplement    Health Maintenance  - Due for Tdap vaccine, last received in 2009  - Administer Tdap vaccine today  - Comprehensive blood work today, including liver function, kidney function, electrolytes, cholesterol, and A1c    Follow-up  - In 1 year for physical exam    Follow Up  Return in about 1 year (around 7/21/2026) for Annual physical.    Patient or patient representative verbalized consent for the use of Ambient Listening during the visit with  Mela Interiano MD for chart documentation. 7/21/2025  14:11 EDT      Mela Interiano MD   [facility]  [facility] [address] [address] [phone]    Please note that portions of this document may have been completed with a voice recognition program.      At [facility], we believe that sharing information builds trust and better relationships. You are receiving this note because you are receiving care at [facility] or have recently visited. It is possible you will see health information before a provider has talked with you about it. This kind of information can be easy to misunderstand as it is a medical document. It is intended as [id number] communication. It is written in medical language and may contain abbreviations or verbiage that are unfamiliar. It may appear blunt or direct. Medical documents are intended to carry relevant information, facts as evident, and the clinical opinion of the practitioner.  To help you fully understand what it means for your health, we urge you to discuss this note with your provider.

## 2025-07-21 NOTE — LETTER
[facility]  Vaccine Consent Form    Patient Name:  Deann Blackburn  Patient :  1995     Vaccine(s) Ordered    Tdap Vaccine => 6yo IM (BOOSTRIX/ADACEL)        Screening Checklist  The following questions should be completed prior to vaccination. If you answer “yes” to any question, it does not necessarily mean you should not be vaccinated. It just means we may need to clarify or ask more questions. If a question is unclear, please ask your healthcare provider to explain it.    Yes No   Any fever or moderate to severe illness today (mild illness and/or antibiotic treatment are not contraindications)?     Do you have a history of a serious reaction to any previous vaccinations, such as anaphylaxis, encephalopathy within 7 days, Guillain-Ninety Six syndrome within 6 weeks, seizure?     Have you received any live vaccine(s) (e.g MMR, AMANDA) or any other vaccines in the last month (to ensure duplicate doses aren't given)?     Do you have an anaphylactic allergy to latex (DTaP, DTaP-IPV, Hep A, Hep B, MenB, RV, Td, Tdap), baker’s yeast (Hep B, HPV), polysorbates (RSV, nirsevimab, PCV 20 and 21, Rotavirrus, Tdap, Shingrix), or gelatin (AMANDA, MMR)?     Do you have an anaphylactic allergy to neomycin (Rabies, AMANDA, MMR, IPV, Hep A), polymyxin B (IPV), or streptomycin (IPV)?      Any cancer, leukemia, AIDS, or other immune system disorder? (AMANDA, MMR, RV)     Do you have a parent, brother, or sister with an immune system problem (if immune competence of vaccine recipient clinically verified, can proceed)? (MMR, AMANDA)     Any recent steroid treatments for >2 weeks, chemotherapy, or radiation treatment? (AMANDA, MMR)     Have you received antibody-containing blood transfusions or IVIG in the past 11 months (recommended interval is dependent on product)? (MMR, AMANDA)     Have you taken antiviral drugs (acyclovir, famciclovir, valacyclovir for AMANDA) in the last 24 or 48 hours, respectively?      Are you pregnant or planning to  "become pregnant within 1 month? (AMANDA, MMR, HPV, IPV, MenB, Abrexvy; For Hep B- refer to Engerix-B; For RSV - Abrysvo is indicated for 32-36 weeks of pregnancy from September to January)     For infants, have you ever been told your child has had intussusception or a medical emergency involving obstruction of the intestine (Rotavirus)? If not for an infant, can skip this question.         *Ordering Physicians/APC should be consulted if \"yes\" is checked by the patient or guardian above.  I have received, read, and understand the Vaccine Information Statement (VIS) for each vaccine ordered.  I have considered my or my child's health status as well as the health status of my close contacts.  I have taken the opportunity to discuss my vaccine questions with my or my child's health care provider.   I have requested that the ordered vaccine(s) be given to me or my child.  I understand the benefits and risks of the vaccines.  I understand that I should remain in the clinic for 15 minutes after receiving the vaccine(s).  _________________________________________________________  Signature of Patient or Parent/Legal Guardian ____________________  Date   "

## 2025-07-22 LAB
ALBUMIN SERPL-MCNC: 4.2 G/DL (ref 3.5–5.2)
ALBUMIN/GLOB SERPL: 1.1 G/DL
ALP SERPL-CCNC: 65 U/L (ref 39–117)
ALT SERPL W P-5'-P-CCNC: 14 U/L (ref 1–33)
ANION GAP SERPL CALCULATED.3IONS-SCNC: 13 MMOL/L (ref 5–15)
AST SERPL-CCNC: 19 U/L (ref 1–32)
BILIRUB SERPL-MCNC: 0.5 MG/DL (ref 0–1.2)
BUN SERPL-MCNC: 8 MG/DL (ref 6–20)
BUN/CREAT SERPL: 10.4 (ref 7–25)
CALCIUM SPEC-SCNC: 9.3 MG/DL (ref 8.6–10.5)
CHLORIDE SERPL-SCNC: 102 MMOL/L (ref 98–107)
CHOLEST SERPL-MCNC: 137 MG/DL (ref 0–200)
CO2 SERPL-SCNC: 23 MMOL/L (ref 22–29)
CREAT SERPL-MCNC: 0.77 MG/DL (ref 0.57–1)
EGFRCR SERPLBLD CKD-EPI 2021: 108.6 ML/MIN/1.73
GLOBULIN UR ELPH-MCNC: 3.9 GM/DL
GLUCOSE SERPL-MCNC: 74 MG/DL (ref 65–99)
HDLC SERPL-MCNC: 47 MG/DL (ref 40–60)
IRON 24H UR-MRATE: 102 MCG/DL (ref 37–145)
IRON SATN MFR SERPL: 23 % (ref 20–50)
LDLC SERPL CALC-MCNC: 70 MG/DL (ref 0–100)
LDLC/HDLC SERPL: 1.45 {RATIO}
POTASSIUM SERPL-SCNC: 4.5 MMOL/L (ref 3.5–5.2)
PROT SERPL-MCNC: 8.1 G/DL (ref 6–8.5)
SODIUM SERPL-SCNC: 138 MMOL/L (ref 136–145)
TIBC SERPL-MCNC: 441 MCG/DL (ref 298–536)
TRANSFERRIN SERPL-MCNC: 296 MG/DL (ref 200–360)
TRIGL SERPL-MCNC: 109 MG/DL (ref 0–150)
VLDLC SERPL-MCNC: 20 MG/DL (ref 5–40)

## 2025-07-23 ENCOUNTER — PRIOR AUTHORIZATION (OUTPATIENT)
Age: 30
End: 2025-07-23
Payer: COMMERCIAL

## 2025-07-23 NOTE — TELEPHONE ENCOUNTER
Key: [id number]    Drug:  Wegovy 1MG/0.5ML auto-injectors    Sent to plan-Awaiting response  7/23/25

## 2025-07-23 NOTE — TELEPHONE ENCOUNTER
We’re pleased to let you know that we’ve approved your or your doctor’s request for coverage  for Wegovy (semaglutide). You can now fill your prescription, and it will be covered according to  your plan.  As long as you remain covered by your prescription drug plan and there are no changes to your  plan benefits, this request is approved from 07/23/2025 to 02/23/2026.

## 2025-08-19 ENCOUNTER — PATIENT MESSAGE (OUTPATIENT)
Age: 30
End: 2025-08-19
Payer: COMMERCIAL

## 2025-08-19 RX ORDER — ONDANSETRON 4 MG/1
4 TABLET, FILM COATED ORAL EVERY 8 HOURS PRN
Qty: 12 TABLET | Refills: 3 | Status: SHIPPED | OUTPATIENT
Start: 2025-08-19

## 2025-08-25 ENCOUNTER — PATIENT MESSAGE (OUTPATIENT)
Age: 30
End: 2025-08-25
Payer: COMMERCIAL